# Patient Record
(demographics unavailable — no encounter records)

---

## 2025-02-04 NOTE — HISTORY OF PRESENT ILLNESS
[FreeTextEntry1] : Mr. Khan is a 72-year-old male born November 11, 1952, last seen 09/26/2024, who we have been following for bothersome lower urinary tract symptoms.  At his last visit we reviewed his record of his intake and output, discussed the implications of the various drugs available, tried to give him Cialis to help both his voiding and his erections but he had an intolerance to it in the past and discussed the use of sildenafil and the need for [place] criteria classification.   His doctor was not yet given the paper to fill out but he has given sex a rest for a while and may want to restart.  He is requesting a repeat copy of the [place] criteria classification .  He was using Flomax and said it helped but he ran out 2 months ago and though it was only a 2-month course. When he stopped, the symptoms were no worse but then he said he feels better with it. Then said he thought it strengthened the bladder. If it does not but just helps voiding he does not need it.  [Currently Experiencing ___] :  [unfilled] [Urinary Urgency] : urinary urgency [Urinary Frequency] : urinary frequency [Nocturia] : nocturia [Weak Stream] : weak stream [Erectile Dysfunction] : Erectile Dysfunction

## 2025-02-04 NOTE — PHYSICAL EXAM
[General Appearance - Well Developed] : well developed [General Appearance - Well Nourished] : well nourished [Normal Appearance] : normal appearance [Well Groomed] : well groomed [General Appearance - In No Acute Distress] : no acute distress [Edema] : no peripheral edema [] : no respiratory distress [Respiration, Rhythm And Depth] : normal respiratory rhythm and effort [Exaggerated Use Of Accessory Muscles For Inspiration] : no accessory muscle use [Normal Station and Gait] : the gait and station were normal for the patient's age [No Focal Deficits] : no focal deficits [Oriented To Time, Place, And Person] : oriented to person, place, and time [Affect] : the affect was normal [Mood] : the mood was normal [Not Anxious] : not anxious [FreeTextEntry1] : 31.12

## 2025-02-04 NOTE — ASSESSMENT
[FreeTextEntry1] : I am not sure what is going on here.  First he tells me he is not currently sexually active then he wants the [place] criteria papers so we can get medication.  He tells me he stopped the Flomax and has not noticed any change and then he wants to know if he should take the medication again.  The end result is he says that he is voiding well enough and unless the Flomax strengthens the bladder which it does not he does not want it He wants the [place] criteria classification paper so he could see if he can get cleared for sex though he is not having it now he is may get back with his girlfriend and he wants to have the option.  To summarize [place] criteria classification follow-up in 1 month

## 2025-02-04 NOTE — ADDENDUM
[FreeTextEntry1] :  note: we discussed the reasons for and against treatment and the fact that no treatment is risk free Longitudinal care (CPT code ): - The patient is diagnosed with erectile dysfunction , which is a chronic condition due to lifestyle choices or metabolic dysregulation, and requires longitudinal care to prevent disease progression and systemic complications.

## 2025-02-04 NOTE — LETTER HEADER
[FreeTextEntry3] : Nik Quintanilla MD [facility] [facility], [address] [place] [address]

## 2025-04-24 NOTE — REVIEW OF SYSTEMS
[see HPI] : see HPI [Itching] : itching [Erectile Dysfunction] : erectile dysfunction [Negative] : Heme/Lymph

## 2025-04-24 NOTE — ADDENDUM
[FreeTextEntry1] :  We saw him twice today  by several hours, the bill being submitted is for the total time  Longitudinal care (CPT code ): - The patient is diagnosed with erectile dysfunction , which is a chronic condition due to lifestyle choices or metabolic dysregulation, and requires longitudinal care to prevent disease progression and systemic complications.

## 2025-04-24 NOTE — ASSESSMENT
[FreeTextEntry1] : He is going to go get the [place] criteria classification if we can fit him in later today we will if not I will see him on Tuesday  He came back in the [place] criteria is dated April 11, 2025 and signed by Dr. Suárez and classifies the patient as low risk as per guidelines  We discussed the various types of PDE 5 inhibitors, the absolute contraindication to take nitrates if he develops chest pain and he taken any of them within 24 to 48 hours, please see the difference is reviewed as listed below, he chooses to go with The more rapid onset, shorter duration as he is not going bar hopping and does not need to be potentiated for 18 hours.  Will start with 1 pill work his way up to find it once he will come back in a month.  If he needs more than 20 mg then they may switch him to the 100 mg tablet and have him cut them in half.    With respect to PDE 5 inhibition there are 2 main classes       The more rapid onset and shorter duration such as sildenafil      The delayed onset and longer duration such as tadalafil. They both have similar risks such as nonarterial ischemic optic neuropathy and tinnitus, they can both cause priapism.  They also have dissimilar risks with tadalafil causing muscle aches while Sildenafil can cause blue vision, headaches, nasal congestion, dyspepsia. There is no guarantee that either will work and there are significant differences in cost with sildenafil being much cheaper.   If he chooses to try sildenafil he can start with the 20 mg tablets he can take anywhere from 1 up to 5 in the time about 30 to 60 minutes before sexual activity preferably with an empty stomach.  If he is going to eat dinner it is better to take it before dinner or waiting at least an hour or more after dinner before taking the pills as sildenafil effectiveness is decreased when there is food in the stomach.  It can last about 4 to 6 hours with good response and some people even longer   If he chooses to try tadalafil he can start with anywhere from 5 up to 20 mg,  for the on-demand 10 mg is usually the starting dose if he is going to take it daily if sexual activity is going to be more frequent,  than 5 mg as a starting dose with a second dose of anywhere from 5 to 15 mg an hour before sexual activity if that is needed.

## 2025-04-24 NOTE — LETTER BODY
[Dear  ___] : Dear  [unfilled], [Courtesy Letter:] : I had the pleasure of seeing your patient, [unfilled], in my office today. [Please see my note below.] : Please see my note below. [Sincerely,] : Sincerely, [FreeTextEntry2] : Dr Jose Armando GRAVES [DrAlberto  ___] : Dr. ARANGO

## 2025-04-24 NOTE — PHYSICAL EXAM
[General Appearance - Well Developed] : well developed [General Appearance - Well Nourished] : well nourished [Normal Appearance] : normal appearance [Well Groomed] : well groomed [General Appearance - In No Acute Distress] : no acute distress [] : no respiratory distress [Respiration, Rhythm And Depth] : normal respiratory rhythm and effort [Exaggerated Use Of Accessory Muscles For Inspiration] : no accessory muscle use [Normal Station and Gait] : the gait and station were normal for the patient's age [No Focal Deficits] : no focal deficits [Oriented To Time, Place, And Person] : oriented to person, place, and time [Affect] : the affect was normal [Mood] : the mood was normal [Not Anxious] : not anxious [FreeTextEntry1] : 31.12 [de-identified] : mild edema

## 2025-04-24 NOTE — HISTORY OF PRESENT ILLNESS
[Currently Experiencing ___] :  [unfilled] [Urinary Urgency] : urinary urgency [Urinary Frequency] : urinary frequency [Nocturia] : nocturia [Weak Stream] : weak stream [Erectile Dysfunction] : Erectile Dysfunction [FreeTextEntry1] : Mr. Khan is a 72-year-old male born November 11, 1952, last seen 02/04/2025, who we have been following for bothersome lower urinary tract symptoms.  At his last visit we reviewed that I am not sure what is going on here. First he tells me he is not currently sexually active then he wants the [place] criteria papers so we can get medication. He tells me he stopped the Flomax and has not noticed any change and then he wants to know if he should take the medication again.  The end result is he says that he is voiding well enough and unless the Flomax strengthens the bladder which it does not he does not want it He wants the [place] criteria classification paper so he could see if he can get cleared for sex though he is not having it now he is may get back with his girlfriend and he wants to have the option.  To summarize [place] criteria classification follow-up in 1 month.  He comes in now telling me that the urination is still good and up without the Flomax that he does not want to take the medication.  He tells me he got the [place] criteria classification but left it at home and wants to know if we can accept that and the answer is no.  Is not that I do not trust him is that sometimes people do not understand what the doctors are saying and unless the doctor is specific letting me know that it is safe for him I am not going to give him medication that might risk his life.  He is going to go home and get the [place] criteria classification, if it is indeed clearing him for sexual activity we will then discuss the options for medication.  If he can get back in today and I have time that we will do it today if not we might have him come back in next Tuesday

## 2025-04-24 NOTE — LETTER HEADER
[FreeTextEntry3] : Nik Quintanilla MD [facility], [address] [place] [address]